# Patient Record
Sex: MALE | Race: ASIAN | NOT HISPANIC OR LATINO | URBAN - METROPOLITAN AREA
[De-identification: names, ages, dates, MRNs, and addresses within clinical notes are randomized per-mention and may not be internally consistent; named-entity substitution may affect disease eponyms.]

---

## 2017-01-06 ENCOUNTER — OFFICE VISIT - HEALTHEAST (OUTPATIENT)
Dept: FAMILY MEDICINE | Facility: CLINIC | Age: 32
End: 2017-01-06

## 2017-01-06 DIAGNOSIS — L63.9 ALOPECIA AREATA: ICD-10-CM

## 2017-01-06 DIAGNOSIS — F17.200 NICOTINE DEPENDENCE: ICD-10-CM

## 2017-01-06 DIAGNOSIS — I10 HYPERTENSION: ICD-10-CM

## 2017-01-06 DIAGNOSIS — Z00.00 HEALTHCARE MAINTENANCE: ICD-10-CM

## 2017-01-06 LAB
ATRIAL RATE - MUSE: 80 BPM
DIASTOLIC BLOOD PRESSURE - MUSE: NORMAL MMHG
INTERPRETATION ECG - MUSE: NORMAL
P AXIS - MUSE: 27 DEGREES
PR INTERVAL - MUSE: 170 MS
QRS DURATION - MUSE: 98 MS
QT - MUSE: 356 MS
QTC - MUSE: 410 MS
R AXIS - MUSE: -44 DEGREES
SYSTOLIC BLOOD PRESSURE - MUSE: NORMAL MMHG
T AXIS - MUSE: -16 DEGREES
VENTRICULAR RATE- MUSE: 80 BPM

## 2017-01-06 RX ORDER — LISINOPRIL 10 MG/1
10 TABLET ORAL DAILY
Qty: 30 TABLET | Refills: 3 | Status: SHIPPED | OUTPATIENT
Start: 2017-01-06

## 2017-01-06 RX ORDER — FLUOCINONIDE 0.5 MG/G
OINTMENT TOPICAL
Qty: 30 G | Refills: 1 | Status: SHIPPED | OUTPATIENT
Start: 2017-01-06

## 2017-01-06 ASSESSMENT — MIFFLIN-ST. JEOR: SCORE: 1685.62

## 2017-01-20 ENCOUNTER — OFFICE VISIT - HEALTHEAST (OUTPATIENT)
Dept: FAMILY MEDICINE | Facility: CLINIC | Age: 32
End: 2017-01-20

## 2017-01-20 DIAGNOSIS — R94.31 ABNORMAL EKG: ICD-10-CM

## 2017-01-20 DIAGNOSIS — I10 HYPERTENSION: ICD-10-CM

## 2017-01-20 DIAGNOSIS — F17.200 SMOKING: ICD-10-CM

## 2017-01-20 DIAGNOSIS — L63.9 ALOPECIA AREATA: ICD-10-CM

## 2017-01-20 ASSESSMENT — MIFFLIN-ST. JEOR: SCORE: 1669.75

## 2017-03-08 ENCOUNTER — OFFICE VISIT - HEALTHEAST (OUTPATIENT)
Dept: FAMILY MEDICINE | Facility: CLINIC | Age: 32
End: 2017-03-08

## 2017-03-08 DIAGNOSIS — S05.01XA CORNEA ABRASION, RIGHT, INITIAL ENCOUNTER: ICD-10-CM

## 2017-03-08 RX ORDER — SULFACETAMIDE SODIUM 100 MG/ML
2 SOLUTION/ DROPS OPHTHALMIC 4 TIMES DAILY
Qty: 15 ML | Refills: 0 | Status: SHIPPED | OUTPATIENT
Start: 2017-03-08

## 2017-03-17 ENCOUNTER — COMMUNICATION - HEALTHEAST (OUTPATIENT)
Dept: FAMILY MEDICINE | Facility: CLINIC | Age: 32
End: 2017-03-17

## 2017-03-20 ENCOUNTER — COMMUNICATION - HEALTHEAST (OUTPATIENT)
Dept: FAMILY MEDICINE | Facility: CLINIC | Age: 32
End: 2017-03-20

## 2020-07-27 ENCOUNTER — COMMUNICATION - HEALTHEAST (OUTPATIENT)
Dept: FAMILY MEDICINE | Facility: CLINIC | Age: 35
End: 2020-07-27

## 2021-05-30 VITALS — WEIGHT: 183 LBS | BODY MASS INDEX: 30.45 KG/M2

## 2021-05-30 VITALS — BODY MASS INDEX: 28.45 KG/M2 | HEIGHT: 66 IN | WEIGHT: 177 LBS

## 2021-05-30 VITALS — HEIGHT: 65 IN | BODY MASS INDEX: 29.49 KG/M2 | WEIGHT: 177 LBS

## 2021-06-08 NOTE — PROGRESS NOTES
Assessment/ Plan  1. Hypertension  Blood pressure acceptable/borderline elevated today.  Has not been taking lisinopril for the last few days.  Cites his use of Vicodin as a concern with interaction.  Recently had wisdom teeth pulled.  Given borderline LVH on EKG, encouraged him to start taking lisinopril again.  He should follow-up in 1-2 months to recheck the pressure and potassium.  2. Abnormal EKG  Borderline abnormal  Has late rightward forces consistent with pulmonary disease which of course he doesn't have.  Has borderline voltage changes for LVH.  Discussed option of obtaining echocardiogram today.  I think instead, I wish to aggressively treat blood pressure, encourage him to quit smoking and follow.  Recheck EKG in 1 year and reconsider echocardiogram at that time.  3. Smoking  Pre-contemplative about quitting.  Strongly encouraged    4. Alopecia areata  Has an appointment with dermatology in 2/3    Body mass index is 29.45 kg/(m^2).    Subjective  CC:  Chief Complaint   Patient presents with     Follow-up     HPI:  31-year-old here for follow-up from hypertension.  Blood sugar was extremely high last time he was seen.  Started him on lisinopril and asked him to come back in 1-2 weeks.  Since this time, he's had his wisdom teeth pulled.  Apparently a very high blood pressure at this visit.  Has held his lisinopril since the surgery due to concern about interaction with Vicodin.  Tolerated the medication okay.  Denies any shortness of breath or chest pain.  Continues to smoke.  PFSH:  Current medications reviewed as follows:  Current Outpatient Prescriptions on File Prior to Visit   Medication Sig     fluocinonide (LIDEX) 0.05 % ointment Apply small amount to rash bid x 2-3 wks     fluocinonide-emollient (FLUOCINONIDE-EMOLLIENT) 0.05 % Crea Apply small amount to rash bid x 2-3 wks     lisinopril (PRINIVIL,ZESTRIL) 10 MG tablet Take 1 tablet (10 mg total) by mouth daily.     nitroglycerin (NITRO-BID) 2 %  "ointment Nitroglycerin 0.2 % ointment SIG: Apply Perianally every 6 hours for 7 days.called into [facility].     polyethylene glycol (GLYCOLAX) 17 gram/dose powder Take 17 g by mouth daily.     No current facility-administered medications on file prior to visit.      Patient Active Problem List   Diagnosis     Tonsillitis     History   Smoking Status     Current Every Day Smoker     Packs/day: 1.00     Types: Cigarettes   Smokeless Tobacco     Never Used     Comment: 10+ years per pt.     Social History     Social History Narrative     Patient Care Team:  Ottoniel Duarte MD as PCP - General (Family Medicine)  ROS  Full 10 system review including constitutional, respiratory, cardiac, gi, urinary, rheumatologic, neurologic, reproductive, dermatologic psychiatric is  performed (via questionnaire) and is negative       Objective  Physical Exam  Vitals:    01/20/17 1545   BP: 122/88   Patient Site: Right Arm   Patient Position: Sitting   Cuff Size: Adult Large   Pulse: 75   Resp: 20   Temp: 98.3  F (36.8  C)   TempSrc: Oral   Weight: 177 lb (80.3 kg)   Height: 5' 5\" (1.651 m)     Gen- alert, oriented/ appropriately responsive  HEENT- normal cephalic, atraumatic.   Chest- Normal inspiration and expiration.  Clear to ascultation.  No chest wall deformity or scar.  CV- Heart regular rate and rhythm, normal tones, no murmurs gallops or rubs.  Ext- appear well perfused, no edema  Skin- warm and dry, no visualized rash    Diagnostics  Reviewed labs from last time.  Urinalysis was negative.  In 2015, Cholesterol was 233, HDL 45, BMP normal.  Reviewed EKG results which showed left axis deviation, pulmonary disease pattern, possible left ventricular hypertrophy.  Please note: Voice recognition software was used in this dictation.  It may therefore contain typographical errors.    "

## 2021-06-08 NOTE — PROGRESS NOTES
Assessment/ Plan  1. Alopecia areata  Nonscarring, no rash, 2 distinct areas, small.  Discussed good prognosis with limited area.  The steroid cream for now, referral to dermatology for confirmation of diagnosis and intralesional injections  - Ambulatory referral to Dermatology    2. Hypertension  It, lisinopril, exercise, handout, discussion of that diet, follow-up 2 weeks to recheck.  Prescription for electronic blood pressure monitor  - Electrocardiogram Perform - Clinic  - Urinalysis-UC if Indicated    3. Nicotine dependence  Between 5 and 10 minutes spent discussing this.  Offered bupropion, Chantix caused side effects in the past.  Declined.    4. Healthcare maintenance  Flu shot    Body mass index is 28.57 kg/(m^2).    Subjective  CC:  Chief Complaint   Patient presents with     Hair/Scalp Problem     balding spots. Patient noticed last month.      Hypertension     Patient concern about blood pressure. 132/100. Patient has not taken BP medication in over a year.      HPI:  Hair loss, 6 weeks, no scarring/ redness, itching.  2 distinct spots on scalp.    Hypertension  Patient was seen about one year ago, started on lisinopril for hypertension.  He took this for a while, and stopped it because he thought his blood pressure was good.  Then over the last couple of weeks she's noticed some headaches and thinks his blood pressure may be high.  He drinks alcohol socially, not regularly.  Smokes cigarettes.  Believes his diet is too high in sweets.  Does not exercise.  He tolerated the lisinopril well.  Voices understanding that he needs to stay on it this time.    Nicotine dependence  Quit smoking in the past for about a month.  It was difficult to do it first.  Thought he had a lift and started smoking again.  Now smokes less than a pack a day.  Cravings, when he is at break at work.  If used to smoke but she quit.    PFSH:  Current medications reviewed as follows:  Current Outpatient Prescriptions on File Prior to  "Visit   Medication Sig     nitroglycerin (NITRO-BID) 2 % ointment Nitroglycerin 0.2 % ointment SIG: Apply Perianally every 6 hours for 7 days.called into [facility].     [DISCONTINUED] lisinopril (PRINIVIL,ZESTRIL) 10 MG tablet Take 1 tablet (10 mg total) by mouth daily.     polyethylene glycol (GLYCOLAX) 17 gram/dose powder Take 17 g by mouth daily.     No current facility-administered medications on file prior to visit.      Patient Active Problem List   Diagnosis     Tonsillitis     History   Smoking Status     Current Every Day Smoker     Packs/day: 1.00     Types: Cigarettes   Smokeless Tobacco     Never Used     Comment: 10+ years per pt.     Social History     Social History Narrative     Patient Care Team:  Ottoniel Duarte MD as PCP - General (Family Medicine)  ROS  Full 10 system review including constitutional, respiratory, cardiac, gi, urinary, rheumatologic, neurologic, reproductive, dermatologic psychiatric is  performed (via questionnaire) and is negative       Objective  Physical Exam  Vitals:    01/06/17 0801   BP: (!) 132/100   Patient Site: Left Arm   Patient Position: Sitting   Cuff Size: Adult Large   Pulse: 88   Resp: 20   Temp: 98.3  F (36.8  C)   TempSrc: Oral   Weight: 177 lb (80.3 kg)   Height: 5' 6\" (1.676 m)     Gen- alert, oriented/ appropriately responsive  HEENT- normal cephalic, atraumatic.   Chest- Normal inspiration and expiration.  Clear to ascultation.  No chest wall deformity or scar.  CV- Heart regular rate and rhythm, normal tones, no murmurs gallops or rubs.  Ext- appear well perfused, no edema  Skin-2 distinct areas of alopecia on parietal scalp, posterior.  Both them about 2 cm in diameter and very distinct margins.  No scale or erythema.  He shaves his head fairly close so tired to determine morphology of remaining here    Diagnostics  UA pending.  Electrocardiogram done and reviewed.  This shows left anterior hemiblock, left axis deviation and is " otherwise normal.  Please note: Voice recognition software was used in this dictation.  It may therefore contain typographical errors.

## 2021-06-09 NOTE — PROGRESS NOTES
At work today sudden wind and sand in right eye.  fb sensation persisted until just prior to arrival.  Tried washing out with water bottle.  Vision a little blurry.  No photophobia.  Lots of tearing.  The pain sensation improved quickly consistent with fb leaving.   Sensation is lateral.    ROS: as noted above    OBJECTIVE:   Vitals:    03/08/17 1052   BP: 134/84   Pulse: 80   Resp: 20      Eyes: non icteric, noninflamed  Lungs: no resp distress  Heart: regular  Ankles: no edema  Muscles: nontender  Mental status: euthymic  Neuro: nonfocal  Mild to moderate injection without matter  perrl  No photophobia   Less than one mm hilario corneal defect 9o'clock a third of way to periphery.  No fb seen    ASSESSMENT/PLAN:   Eye fb gone  Corneal abrasion/ gtts for preventing infection and for comfort  Anticipate resolution otherwise return.  Return sooner if symptoms worsen.  More than 10 of fifteen total minutes time spent education counseling regarding the issues and care of same as listed in the assessment and plan of this note    This is work comp  Additional diagnoses and related orders:  1. Cornea abrasion, right, initial encounter  sulfacetamide (SULAMYD) 10 % ophthalmic solution

## 2021-06-10 NOTE — TELEPHONE ENCOUNTER
Who is calling:  Patient  Reason for Call:  Pt his wife, and 2 other children tested negative for COVID19.  Pt has 2 other children that tested positive.  Pt wondering what provider thinks he should do?  Pt's employer needs to know.  Please advise.  Date of last appointment with primary care: N/A  Okay to leave a detailed message: No